# Patient Record
Sex: FEMALE | Employment: UNEMPLOYED | URBAN - NONMETROPOLITAN AREA
[De-identification: names, ages, dates, MRNs, and addresses within clinical notes are randomized per-mention and may not be internally consistent; named-entity substitution may affect disease eponyms.]

---

## 2023-12-14 ENCOUNTER — OFFICE VISIT (OUTPATIENT)
Dept: URGENT CARE | Facility: CLINIC | Age: 78
End: 2023-12-14
Payer: MEDICARE

## 2023-12-14 VITALS
SYSTOLIC BLOOD PRESSURE: 118 MMHG | TEMPERATURE: 98.6 F | HEART RATE: 81 BPM | DIASTOLIC BLOOD PRESSURE: 62 MMHG | RESPIRATION RATE: 16 BRPM | OXYGEN SATURATION: 100 %

## 2023-12-14 DIAGNOSIS — G89.29 CHRONIC RIGHT-SIDED LOW BACK PAIN WITHOUT SCIATICA: Primary | ICD-10-CM

## 2023-12-14 DIAGNOSIS — M54.50 CHRONIC RIGHT-SIDED LOW BACK PAIN WITHOUT SCIATICA: Primary | ICD-10-CM

## 2023-12-14 PROCEDURE — 99203 OFFICE O/P NEW LOW 30 MIN: CPT

## 2023-12-14 PROCEDURE — G0463 HOSPITAL OUTPT CLINIC VISIT: HCPCS

## 2023-12-14 RX ORDER — PREDNISONE 20 MG/1
20 TABLET ORAL 2 TIMES DAILY WITH MEALS
Qty: 10 TABLET | Refills: 0 | Status: SHIPPED | OUTPATIENT
Start: 2023-12-14 | End: 2023-12-19

## 2023-12-14 RX ORDER — OMEPRAZOLE 40 MG/1
40 CAPSULE, DELAYED RELEASE ORAL DAILY
COMMUNITY

## 2023-12-14 RX ORDER — MECLIZINE HYDROCHLORIDE 25 MG/1
25 TABLET ORAL EVERY 8 HOURS PRN
COMMUNITY

## 2023-12-14 RX ORDER — NEBIVOLOL 5 MG/1
5 TABLET ORAL DAILY
COMMUNITY

## 2023-12-14 NOTE — PROGRESS NOTES
[facility]        NAME: Zuleika Burgess is a 66 y.o. female  : 1945    MRN: [id number]  DATE: 2023  TIME: 4:42 PM    Assessment and Plan   Chronic right-sided low back pain without sciatica [M54.50, G89.29]  1. Chronic right-sided low back pain without sciatica  predniSONE 20 mg tablet    Ambulatory Referral to Physical Therapy        Chronic right lower back pain without sciatica. This has been going on for a long time. Has not been to physical therapy. PT states she got prednisone prescribed to her in the past which was very helpful. Will send that in today (history of diabetes on insulin) advised to monitor blood sugar. Given referral physical therapy. Advised to go to the ER if any symptoms worsen. Patient Instructions     Take prescribed medication as instructed. Monitor blood sugars closely. Tylenol for pain. Avoid ibuprofen until finished with steroids. Warm compresses 4-5 times daily for 10 to 15 minutes. May alternate with ice. Stretching as instructed. Follow-up with physical therapy as discussed. If any symptoms worsen-go to the emergency room. Follow up with PCP in 3-5 days. Proceed to  ER if symptoms worsen. Chief Complaint     Chief Complaint   Patient presents with    Back Pain     Started 3 days ago  Right posterior back pain, and right buttock pain]  Pain is stabbing pain  OTC tylenol, and ibuprofen         History of Present Illness       77-year-old female presents to the clinic with right low back pain that does not radiate down her right leg. PT states has been going on for about 3 days but has been a chronic issue for a long time. Describes pain as stabbing. Taking Tylenol and ibuprofen over-the-counter. PT is a diabetic on insulin. States she was given prednisone in the past which really helped her symptoms.   Denies any fever, chills, numbness, tingling, saddle anesthesia, bowel/bladder incontinence, chest pain, shortness of breath. Review of Systems   Review of Systems   Constitutional: Negative. HENT: Negative. Respiratory: Negative. Cardiovascular: Negative. Genitourinary: Negative. Musculoskeletal:  Positive for back pain. Neurological: Negative. Current Medications       Current Outpatient Medications:     insulin regular (HumuLIN R,NovoLIN R) 100 units/mL injection, Inject 7 Units under the skin 2 (two) times a day with meals, Disp: , Rfl:     meclizine (ANTIVERT) 25 mg tablet, Take 25 mg by mouth every 8 (eight) hours as needed for dizziness, Disp: , Rfl:     nebivolol (BYSTOLIC) 5 mg tablet, Take 5 mg by mouth daily, Disp: , Rfl:     omeprazole (PriLOSEC) 40 MG capsule, Take 40 mg by mouth daily, Disp: , Rfl:     predniSONE 20 mg tablet, Take 1 tablet (20 mg total) by mouth 2 (two) times a day with meals for 5 days, Disp: 10 tablet, Rfl: 0    Current Allergies     Allergies as of 12/14/2023 - Reviewed 12/14/2023   Allergen Reaction Noted    Insulin Rash 03/10/2008            The following portions of the patient's history were reviewed and updated as appropriate: allergies, current medications, past family history, past medical history, past social history, past surgical history and problem list.     Past Medical History:   Diagnosis Date    Diabetes mellitus ([address] [facility])     Gastric cancer ([address] [facility])     GERD (gastroesophageal reflux disease)     Hypertension        Past Surgical History:   Procedure Laterality Date    LAPAROSCOPIC PARTIAL GASTRECTOMY         No family history on file. Medications have been verified. Objective   /62   Pulse 81   Temp 98.6 °F (37 °C)   Resp 16   SpO2 100%        Physical Exam     Physical Exam  Constitutional:       General: She is not in acute distress. Appearance: Normal appearance. She is not ill-appearing or diaphoretic. HENT:      Head: Normocephalic and atraumatic. Eyes:      Extraocular Movements: Extraocular movements intact. Pupils: Pupils are equal, round, and reactive to light. Cardiovascular:      Rate and Rhythm: Normal rate and regular rhythm. Pulses: Normal pulses. Heart sounds: Normal heart sounds. Pulmonary:      Effort: Pulmonary effort is normal. No respiratory distress. Breath sounds: Normal breath sounds. Musculoskeletal:      Lumbar back: Tenderness (Mild tenderness to palpation in the right lower paraspinous muscles of the lumbar spine extending out into the right upper buttock region. Straight leg raise does reproduce some pain and discomfort.) present. No swelling, deformity or signs of trauma. Normal range of motion. Back:       Comments: Equal sensation and strength in the knees and hips (with flexion and extension bilaterally). Skin:     General: Skin is warm and dry. Capillary Refill: Capillary refill takes less than 2 seconds. Findings: No erythema or rash. Neurological:      General: No focal deficit present. Mental Status: She is alert and oriented to person, place, and time. Mental status is at baseline. GCS: GCS eye subscore is 4. GCS verbal subscore is 5. GCS motor subscore is 6. Cranial Nerves: Cranial nerves 2-12 are intact. No cranial nerve deficit. Sensory: Sensation is intact. No sensory deficit. Motor: Motor function is intact. No weakness. Coordination: Coordination is intact. Coordination normal.      Gait: Gait normal.      Deep Tendon Reflexes: Reflexes are normal and symmetric.  Reflexes normal.   Psychiatric:         Mood and Affect: Mood normal.         Behavior: Behavior normal.

## 2023-12-14 NOTE — PATIENT INSTRUCTIONS
Take prescribed medication as instructed. Monitor blood sugars closely. Tylenol for pain. Avoid ibuprofen until finished with steroids. Warm compresses 4-5 times daily for 10 to 15 minutes. May alternate with ice. Stretching as instructed. Follow-up with physical therapy as discussed. If any symptoms worsen-go to the emergency room. Follow up with PCP in 3-5 days. Proceed to  ER if symptoms worsen. Ejercicios para la espalda baja   CUIDADO AMBULATORIO:   Los ejercicios para la espalda baja ayudan a sanar y a fortalecer los músculos de perez espalda para evitar otra lesión. Pregúntele a perez médico si usted necesita acudir con un fisioterapeuta para que le indique ejercicios más avanzado. Busque atención médica de inmediato si:  Usted tiene dolor severo que le impide moverse. Llame a perez médico si:  Perez dolor empeora. Usted tiene un dolor nuevo. Usted tiene preguntas o inquietudes acerca de perez condición o cuidado. Realice los ejercicios para la espalda baja de manera zee:  Cornelio los ejercicios sobre chase colchoneta o superficie firme (no sobre chase cama). Chase superficie firme sostendrá perez columna y evitará el dolor lumbar. Muévase lenta y suavemente. Evite movimientos rápidos o bruscos. Respire normalmente. No contenga la respiración. Deténgase si siente dolor. Es normal sentir alguna molestia al principio, ravinder no debería sentir dolor. Practicar los ejercicios con regularidad ayudará a disminuir perez incomodidad con el paso del [place]. Ejercicios para la espalda baja: Perez médico podría recomendarle que realice ejercicios para la espalda de 10 a 30 minutos cada día. También podría recomendarle que cornelio ejercicios de 1 a 3 veces cada día. Pregunte a perez médico cuáles ejercicios son los mejores para usted y con qué frecuencia hacerlos. Bombeo del tobillo: Acuéstese boca arriba. Levante perez pie (con fede dedos apuntando hacia perez jr).  Luego, baje perez pie (con los dedos apuntando lejos de usted). Repita luc ejercicio 10 veces en cada lado. Deslizamiento de talón: Acuéstese boca arriba. Muy despacio doble chase pierna y luego enderécela. Luego, doble la otra pierna y enderécela. Repita 10 veces en cada lado. Inclinación pélvica: Acuéstese boca arriba con fede rodillas dobladas y fede pies planos sobre el piso. Coloque fede brazos en chase posición relajada junto a perez cuerpo. Contraiga los músculos de perez abdomen y aplane perez espalda contra el piso. Sostenga está posición por 5 segundos. Repita 5 veces. Estiramiento de la espalda: Acuéstese boca arriba con fede keisha detrás de perez jr. Doble fede rodillas y gire la mitad de perez cuerpo hacia un lado. Mantenga esta posición por 10 segundos. Repita 3 veces en cada lado. Levantamiento de la pierna estirada: Acuéstese boca Vicente Earing con chase pierna estirada. Doble la otra rodilla. Contraiga perez abdomen y luego levante lentamente la pierna estirada entre 6 a 12 pulgadas del piso. Mantenga esta posición por 1 a 5 segundos. Baje perez pierna lentamente. Repita 10 veces en cada pierna. Rodillas al pecho: Acuéstese boca arriba con fede rodillas dobladas y fede pies planos sobre el piso. Gleda  chase de las rodillas hacia perez pecho y sosténgala por 5 segundos. Regrese perez pierna a la posición inicial. Levante la otra rodilla hacia el pecho y sosténgala por 5 segundos. Miryam esto 5 veces en cada lado. Posición hieu camello: Coloque fede keisha y [employer] Arquee perez espalda Naty [place], hacia el techo y [place] Arquee perez elyssa dorsal lo más posible. Sostenga está posición por 5 segundos. Levante perez jr hacia arriba y baje perez pecho hacia el piso. Sostenga está posición por 5 segundos. Miryam 3 series o blake se le indique. Posición de cuclillas contra la pared: Párese con perez espalda contra la pared. Contraiga los músculos de perez abdomen.  Lentamente deslice perez cuerpo hasta que fede rodillas queden dobladas en un ángulo de 45 grados. Mantenga esta posición por 5 segundos. Deslice lentamente perez espalda hacia arriba hasta quedar de pie. Repita 10 veces. Posición de acurrucarse: Acuéstese boca arriba con fede rodillas dobladas y fede pies planos sobre el piso. Coloque fede keisha con las suraj hacia abajo debajo de la curva de la parte baja de perez espalda. Después, con fede codos [employer], levante fede hombros y [place] 2 a 3 pulgadas. Mantenga perez jr a la misma altura de fede hombros. Mantenga esta posición por 5 segundos. Cuando usted pueda hacer luc ejercicio sin sentir dolor por 10 a 15 segundos, puede entonces añadir chase rotación. Mientras fede hombros y perez pecho estén levantados del piso, voltee levemente hacia la izquierda y [place]. Repita en el otro lado. Ejercicio pájaro denise: Coloque fede keisha y rodillas sobre el piso. Mantenga fede muñecas directamente debajo de fede hombros y fede rodillas directamente debajo de fede caderas. Contraiga perez ombligo hacia adentro en dirección a perez columna. No estire ni arquee perez espalda. Ponga tensos fede músculos abdominales. Levante un brazo extendido para que se alinee con perez jr. Luego, levante la pierna opuesta a perez brazo. Mantenga esta posición por 15 segundos. Baje perez [employer] y pierna lentamente y prince edward isl de lado. Miryam 5 series. Acuda a la consulta de control con perez médico según las indicaciones: Anote fede preguntas para que se acuerde de hacerlas rosendo fede visitas. © Copyright Marco Antonio Ports 2023 Information is for End User's use only and may not be sold, redistributed or otherwise used for commercial purposes. Esta información es sólo para uso en educación. Perez intención no es darle un consejo médico sobre enfermedades o tratamientos. Colsulte con perez [facility] farmacéutico antes de seguir cualquier régimen médico para saber si es seguro y efectivo para usted. Dolor de espalda   CUIDADO AMBULATORIO:   Dolor en la espalda es común.  Usted puede tener dolor de espalda y espasmos musculares. Usted puede estar adolorido o sentir rigidez en eileen o ambos lados de la espalda. El dolor se puede propagar a la parte baja del cuerpo. Las condiciones que afectan la columna, las articulaciones, o los músculos pueden causar el dolor de espalda. Estos pueden incluir la artritis, estenosis de la elyssa dorsal (estrechamiento de la columna vertebral), tensión muscular o descomposición de los discos de la columna vertebral.  Llame al [facility] de emergencias local (911 en los [place]) si:  Usted tiene dolor de espalda intenso con dolor en el pecho. Usted no puede controlar perez orina ni fede deposiciones intestinales. El dolor se vuelve tan intenso que lo incapacita para caminar. Busque atención médica de inmediato si:  Usted tiene dolor, entumecimiento o debilidad en chase o en ambas piernas. Usted tiene dolor de espalda intenso, náuseas y vómito. Usted tiene un dolor de espalda intenso que se propaga a un costado o al área genital.    Llame a perez médico si:  Usted tiene dolor de espalda que no mejora con el reposo, ni con el medicamento para el dolor. Tiene fiebre. Usted tiene un dolor que empeora cuando está acostado boca Doren Herder o al descansar. Usted tiene [employer] cuando tose o estornuda. Usted pierde peso sin proponérselo. Usted tiene preguntas o inquietudes acerca de perez condición o cuidado. Medicamentos: Es posible que usted necesite alguno de los siguientes:  SATINDER pueden disminuir la inflamación y el dolor o la fiebre. Luc medicamento está disponible con o sin chase receta médica. Los SATINDER pueden causar sangrado estomacal o problemas renales en ciertas personas. Si usted jamey un medicamento anticoagulante, siempre pregúntele a perez médico si los SATINDER son seguros para usted. Siempre helena la etiqueta de luc medicamento y [address] instrucciones. Acetaminofén [employer] dolor y baja la fiebre. Está disponible sin receta médica. Pregunte la cantidad y la frecuencia con que debe tomarlos. [address] Shyann las etiquetas de todos los demás medicamentos que esté usando para saber si también contienen acetaminofén, o pregunte a perez médico o farmacéutico. El acetaminofén puede causar daño en el hígado cuando no se jamey de forma correcta. Relajantes musculares ayudan a disminuir los espasmos musculares y el dolor de espalda. [place] fede medicamentos blake se le haya indicado. Consulte con perez médico si usted brent que perez medicamento no le está ayudando o si presenta efectos secundarios. Infórmele al médico si usted es alérgico a algún medicamento. Mantenga chase lista actualizada de los [place], las vitaminas y los productos herbales que jamey. Incluya los siguientes datos de los medicamentos: cantidad, frecuencia y motivo de administración. Traiga con usted la lista o los envases de las píldoras a fede citas de seguimiento. Lleve la lista de los medicamentos con usted en kamlesh de chase emergencia. Controlando perez dolor de espalda:  Aplique hielo en la espalda de 15 a 20 minutos cada hora o blake se le indique. Use chase compresa de hielo o ponga hielo triturado en chase bolsa de plástico. Kyler Distance con chase toalla antes de aplicarlo sobre perez piel. El hielo disminuye el dolor y ayuda a evitar el daño en los tejidos. Aplique calor en la espalda de 20 a 30 minutos cada 2 horas por los [employer] indiquen. El calor ayuda a disminuir el dolor y los espasmos musculares. Manténgase activo lo más que pueda sin causar más dolor. El reposo en cama puede empeorar perez dolor de espalda. Evite levantar objetos hasta que ya no tenga dolor. Vaya a terapia física según las indicaciones. Un fisioterapeuta puede enseñarle ejercicios que contribuyan a mejorar perez movimiento y fuerza, y a aliviar el dolor. Acuda a chase consulta de control con perez médico dentro de 2 semanas, o según le hayan indicado: Es posible que necesite nida a un especialista.  Anote fede preguntas para que se acuerde de hacerlas rosendo fede visitas. © Copyright Rosaura Abreu 2023 Information is for End User's use only and may not be sold, redistributed or otherwise used for commercial purposes. Esta información es sólo para uso en educación. Perez intención no es darle un consejo médico sobre enfermedades o tratamientos. Colsulte con perez Jermikelene Brandon farmacéutico antes de seguir cualquier régimen médico para saber si es seguro y efectivo para usted.

## 2023-12-20 ENCOUNTER — EVALUATION (OUTPATIENT)
Dept: PHYSICAL THERAPY | Facility: CLINIC | Age: 78
End: 2023-12-20
Payer: MEDICARE

## 2023-12-20 DIAGNOSIS — M54.50 ACUTE RIGHT-SIDED LOW BACK PAIN WITHOUT SCIATICA: Primary | ICD-10-CM

## 2023-12-20 PROCEDURE — 97162 PT EVAL MOD COMPLEX 30 MIN: CPT | Performed by: PHYSICAL THERAPIST

## 2023-12-20 NOTE — PROGRESS NOTES
"PT Evaluation     Today's date: 2023  Patient name: Schuyler Dowling  : 1945  MRN: [id number]  Referring provider: Justo Villafana*  Dx:   Encounter Diagnosis     ICD-10-CM    1. Acute right-sided low back pain without sciatica  M54.50           Start Time: 1400  Stop Time: 1450  Total time in clinic (min): 50 minutes    Assessment  Assessment details: Patient is a 79 y/o female here today with chief c/o R sided low back pain. Patient is accompanied by her daughter who per her request is assisting with translation. Patient has noted tenderness with percussion of the R side of the T11-12 segment. She has marked limitation with lumbar mobility assessment today secondary to increased pain. She is not tolerant for either prone or supine positions secondary to increased pain. There is limitation noted to B/L LE strength most noted with hip flexion and extension with increased pain during resisted adduction and flexion. Held on treatment today secondary to recent x-rays which have not been read yet or communication of results have not been passed along to the patient. We will await confirmation of no fracture due to history of osteoporosis and spinal fracture.   Impairments: abnormal or restricted ROM, activity intolerance, impaired physical strength, lacks appropriate home exercise program, pain with function and poor posture     Symptom irritability: highUnderstanding of Dx/Px/POC: good   Prognosis: fair    Goals  STGs:  \"Patient will be independent with hep by 2-3 visits.   Decrease low back pain by 25% for improved tolerance with adls and home duties by 3-4 weeks.   Improve Lumbar rom to wfl for improved tolerance with adls and home duties by 3-4 weeks. \"    LTGs:  \"Improve FOTO score from 24 to 53 indicating improved tolerance with activities involving the low back by discharge.   Patient will demonstrate rom and strength to the lumbar spine wfl for improved tolerance with adls and home duties by " "discharge.   Patient will be free of radicular symptoms by discharge. \"      Plan  Plan details: Patient informed that from this point forward, to ensure adherence to the aforementioned plan of care, all or some of the treatment may be performed and carried out by a Physical Therapy Assistant (PTA) with supervision from a licensed Physical Therapist (PT) in accordance with [facility] Physical Therapy Practice Act.  Patient will continue to benefit from skilled physical therapy to address the functional deficits that were identified during the evaluation today. We will continue to progress the therapy program to address these functional deficits and achieve the established goals.          Patient would benefit from: skilled physical therapy  Planned modality interventions: cryotherapy and thermotherapy: hydrocollator packs  Planned therapy interventions: abdominal trunk stabilization, ADL retraining, body mechanics training, flexibility, functional ROM exercises, home exercise program, therapeutic exercise, therapeutic activities, stretching, strengthening, postural training, patient education, joint mobilization and manual therapy  Frequency: 2x week  Duration in weeks: 6  Plan of Care beginning date: 12/20/2023  Plan of Care expiration date: 1/31/2024  Treatment plan discussed with: patient      Subjective Evaluation    History of Present Illness  Mechanism of injury: Patient presents to out patient physical therapy with chief c/o R sided LBP. Patient reports acute exacerbation of R sided low back pain over the past week. Prior to this she had an episode last September but was pain free between then and now. She has a history of low back procedure in 2010 where she had a fractured vertebral body which was treated with cementing procedure. She notes that her pain was slightly more controlled last night due to the new medication she was provided. She is denying symptoms into the R LE. No recent changes to " bowel or bladder function.           Not a recurrent problem   Quality of life: good    Patient Goals  Patient goals for therapy: decreased pain, independence with ADLs/IADLs and increased motion  Patient goal: Patient wishes to be able to return to her normal adls and house work without restriction.  Pain  Current pain ratin  At best pain ratin  At worst pain rating: 10  Location: Lumbar  Quality: radiating, discomfort, dull ache and sharp  Relieving factors: medications and heat  Aggravating factors: sitting, standing, walking and lifting    Social Support  Lives with: adult children    Employment status: not working  Treatments  Previous treatment: medication      Objective     Concurrent Complaints  Positive for night pain, disturbed sleep and history of cancer. Negative for bladder dysfunction, bowel dysfunction, saddle (S4) numbness, history of trauma and infection    Neurological Testing     Sensation     Lumbar   Left   Intact: light touch    Right   Intact: light touch    Reflexes   Left   Patellar (L4): trace (1+)  Achilles (S1): trace (1+)  Clonus sign: negative    Right   Patellar (L4): trace (1+)  Achilles (S1): trace (1+)  Clonus sign: negative    Active Range of Motion     Lumbar   Flexion:  with pain Restriction level: moderate  Extension:  Restriction level: maximal  Left lateral flexion:  with pain Restriction level: moderate  Right lateral flexion:  with pain Restriction level: moderate  Left rotation:  with pain Restriction level: moderate  Right rotation:  with pain Restriction level: moderate    Strength/Myotome Testing     Left Hip   Planes of Motion   Flexion: 3+    Right Hip   Planes of Motion   Flexion: 3+    Left Knee   Flexion: 4+  Extension: 4    Right Knee   Flexion: 4+  Extension: 4    Left Ankle/Foot   Dorsiflexion: 4+  Plantar flexion: 4+  Great toe extension: 4+    Right Ankle/Foot   Dorsiflexion: 4+  Plantar flexion: 4+  Great toe extension: 4+    Tests     Additional Tests  Details  Positive percussion test to the R side T11/12 segment.       Flowsheet Rows      Flowsheet Row Most Recent Value   PT/OT G-Codes    Current Score 24   Projected Score 53               Precautions: Hx of gastric cancer, Hx of osteoporosis, Hx of spinal fracture.       Date 12/20 IE       FOTO 24 SC       Manuals                                        Neuro Re-Ed                                                                Ther Ex                                                                        Ther Activity                        Gait Training                        Modalities

## 2023-12-20 NOTE — LETTER
2023    Justo Villafana PA-C  [address] [facility]  [place] [address]    Patient: Schuyler Dowling   YOB: 1945   Date of Visit: 2023     Encounter Diagnosis     ICD-10-CM    1. Acute right-sided low back pain without sciatica  M54.50           Dear Dr. Villafana:    Thank you for your recent referral of Schuyler Dowling. Please review the attached evaluation summary from Schuyler's recent visit.     Please verify that you agree with the plan of care by signing the attached order.     If you have any questions or concerns, please do not hesitate to call.     I sincerely appreciate the opportunity to share in the care of one of your patients and hope to have another opportunity to work with you in the near future.       Sincerely,    Tru Quispe, PT      Referring Provider:      I certify that I have read the below Plan of Care and certify the need for these services furnished under this plan of treatment while under my care.                    Justo Villafana PA-C  [address] [facility]  [place] [address]  Via Fax: [phone]          PT Evaluation     Today's date: 2023  Patient name: Schuyler Dowling  : 1945  MRN: [id number]  Referring provider: Justo Villafana*  Dx:   Encounter Diagnosis     ICD-10-CM    1. Acute right-sided low back pain without sciatica  M54.50           Start Time: 1400  Stop Time: 1450  Total time in clinic (min): 50 minutes    Assessment  Assessment details: Patient is a 77 y/o female here today with chief c/o R sided low back pain. Patient is accompanied by her daughter who per her request is assisting with translation. Patient has noted tenderness with percussion of the R side of the T11-12 segment. She has marked limitation with lumbar mobility assessment today secondary to increased pain. She is not tolerant for either prone or supine positions secondary to increased pain. There is limitation noted to B/L LE strength most noted  "with hip flexion and extension with increased pain during resisted adduction and flexion. Held on treatment today secondary to recent x-rays which have not been read yet or communication of results have not been passed along to the patient. We will await confirmation of no fracture due to history of osteoporosis and spinal fracture.   Impairments: abnormal or restricted ROM, activity intolerance, impaired physical strength, lacks appropriate home exercise program, pain with function and poor posture     Symptom irritability: highUnderstanding of Dx/Px/POC: good   Prognosis: fair    Goals  STGs:  \"Patient will be independent with hep by 2-3 visits.   Decrease low back pain by 25% for improved tolerance with adls and home duties by 3-4 weeks.   Improve Lumbar rom to wfl for improved tolerance with adls and home duties by 3-4 weeks. \"    LTGs:  \"Improve FOTO score from 24 to 53 indicating improved tolerance with activities involving the low back by discharge.   Patient will demonstrate rom and strength to the lumbar spine wfl for improved tolerance with adls and home duties by discharge.   Patient will be free of radicular symptoms by discharge. \"      Plan  Plan details: Patient informed that from this point forward, to ensure adherence to the aforementioned plan of care, all or some of the treatment may be performed and carried out by a Physical Therapy Assistant (PTA) with supervision from a licensed Physical Therapist (PT) in accordance with [facility] Physical Therapy Practice Act.  Patient will continue to benefit from skilled physical therapy to address the functional deficits that were identified during the evaluation today. We will continue to progress the therapy program to address these functional deficits and achieve the established goals.          Patient would benefit from: skilled physical therapy  Planned modality interventions: cryotherapy and thermotherapy: hydrocollator packs  Planned therapy " interventions: abdominal trunk stabilization, ADL retraining, body mechanics training, flexibility, functional ROM exercises, home exercise program, therapeutic exercise, therapeutic activities, stretching, strengthening, postural training, patient education, joint mobilization and manual therapy  Frequency: 2x week  Duration in weeks: 6  Plan of Care beginning date: 2023  Plan of Care expiration date: 2024  Treatment plan discussed with: patient      Subjective Evaluation    History of Present Illness  Mechanism of injury: Patient presents to out patient physical therapy with chief c/o R sided LBP. Patient reports acute exacerbation of R sided low back pain over the past week. Prior to this she had an episode last September but was pain free between then and now. She has a history of low back procedure in  where she had a fractured vertebral body which was treated with cementing procedure. She notes that her pain was slightly more controlled last night due to the new medication she was provided. She is denying symptoms into the R LE. No recent changes to bowel or bladder function.           Not a recurrent problem   Quality of life: good    Patient Goals  Patient goals for therapy: decreased pain, independence with ADLs/IADLs and increased motion  Patient goal: Patient wishes to be able to return to her normal adls and house work without restriction.  Pain  Current pain ratin  At best pain ratin  At worst pain rating: 10  Location: Lumbar  Quality: radiating, discomfort, dull ache and sharp  Relieving factors: medications and heat  Aggravating factors: sitting, standing, walking and lifting    Social Support  Lives with: adult children    Employment status: not working  Treatments  Previous treatment: medication      Objective     Concurrent Complaints  Positive for night pain, disturbed sleep and history of cancer. Negative for bladder dysfunction, bowel dysfunction, saddle (S4) numbness,  history of trauma and infection    Neurological Testing     Sensation     Lumbar   Left   Intact: light touch    Right   Intact: light touch    Reflexes   Left   Patellar (L4): trace (1+)  Achilles (S1): trace (1+)  Clonus sign: negative    Right   Patellar (L4): trace (1+)  Achilles (S1): trace (1+)  Clonus sign: negative    Active Range of Motion     Lumbar   Flexion:  with pain Restriction level: moderate  Extension:  Restriction level: maximal  Left lateral flexion:  with pain Restriction level: moderate  Right lateral flexion:  with pain Restriction level: moderate  Left rotation:  with pain Restriction level: moderate  Right rotation:  with pain Restriction level: moderate    Strength/Myotome Testing     Left Hip   Planes of Motion   Flexion: 3+    Right Hip   Planes of Motion   Flexion: 3+    Left Knee   Flexion: 4+  Extension: 4    Right Knee   Flexion: 4+  Extension: 4    Left Ankle/Foot   Dorsiflexion: 4+  Plantar flexion: 4+  Great toe extension: 4+    Right Ankle/Foot   Dorsiflexion: 4+  Plantar flexion: 4+  Great toe extension: 4+    Tests     Additional Tests Details  Positive percussion test to the R side T11/12 segment.       Flowsheet Rows      Flowsheet Row Most Recent Value   PT/OT G-Codes    Current Score 24   Projected Score 53               Precautions: Hx of gastric cancer, Hx of osteoporosis, Hx of spinal fracture.       Date 12/20 IE       FOTO 24 SC       Manuals                                        Neuro Re-Ed                                                                Ther Ex                                                                        Ther Activity                        Gait Training                        Modalities

## 2023-12-27 ENCOUNTER — OFFICE VISIT (OUTPATIENT)
Dept: PHYSICAL THERAPY | Facility: CLINIC | Age: 78
End: 2023-12-27
Payer: MEDICARE

## 2023-12-27 DIAGNOSIS — M54.50 ACUTE RIGHT-SIDED LOW BACK PAIN WITHOUT SCIATICA: Primary | ICD-10-CM

## 2023-12-27 PROCEDURE — 97112 NEUROMUSCULAR REEDUCATION: CPT

## 2023-12-27 PROCEDURE — 97110 THERAPEUTIC EXERCISES: CPT

## 2023-12-27 NOTE — PROGRESS NOTES
"Daily Note     Today's date: 2023  Patient name: Schuyler Dowling  : 1945  MRN: [id number]  Referring provider: Justo Villafana*  Dx:   Encounter Diagnosis     ICD-10-CM    1. Acute right-sided low back pain without sciatica  M54.50           Start Time: 1500  Stop Time: 1530  Total time in clinic (min): 30 minutes    Subjective: Pt notes receiving xray report clear of fracture; she does have some R sided thoracic pain upon arrival to the clinic.      Objective: See treatment diary below      Assessment: Tolerated treatment fair. Patient would benefit from continued PT. PTA initiated gentle  activities for core strengthening, flexibility per PT POC.  Handouts given for HEP of same minus nu-step.      Plan: Continue per plan of care.      Precautions: Hx of gastric cancer, Hx of osteoporosis, Hx of spinal fracture.       Date  IE       FOTO 24 SC       Manuals                                        Neuro Re-Ed        TA sitting  15x 5\" seated      TA c march  15x 5\" seated                                              Ther Ex        Nu-Step  5m L3       Ball roll out  10x 5\"                                                      Ther Activity        STS  NV              Gait Training                        Modalities        HP  10m                     "

## 2024-01-03 ENCOUNTER — OFFICE VISIT (OUTPATIENT)
Dept: PHYSICAL THERAPY | Facility: CLINIC | Age: 79
End: 2024-01-03
Payer: MEDICARE

## 2024-01-03 DIAGNOSIS — M54.50 ACUTE RIGHT-SIDED LOW BACK PAIN WITHOUT SCIATICA: Primary | ICD-10-CM

## 2024-01-03 PROCEDURE — 97110 THERAPEUTIC EXERCISES: CPT | Performed by: PHYSICAL THERAPIST

## 2024-01-03 PROCEDURE — 97112 NEUROMUSCULAR REEDUCATION: CPT | Performed by: PHYSICAL THERAPIST

## 2024-01-03 NOTE — PROGRESS NOTES
"Daily Note     Today's date: 1/3/2024  Patient name: Schuyler Dowling  : 1945  MRN: [id number]  Referring provider: Justo Villafana*  Dx:   Encounter Diagnosis     ICD-10-CM    1. Acute right-sided low back pain without sciatica  M54.50           Start Time: 1400  Stop Time: 1500  Total time in clinic (min): 60 minutes    Subjective: Patient reports improvements with her low back pain. She is sleeping better and does not have as much pain when she is standing or walking.       Objective: See treatment diary below      Assessment: Tolerated treatment well. Patient demonstrated fatigue post treatment, exhibited good technique with therapeutic exercises, and would benefit from continued PT Patient responded well to therapy interventions with a progression of core strengthening. There was a decrease in pain levels noted post therapy interventions.       Plan: Continue per plan of care.  Progress treatment as tolerated.       Precautions: Hx of gastric cancer, Hx of osteoporosis, Hx of spinal fracture.       Date  IE 12/27 1/3     FOTO 24 SC       Manuals                                        Neuro Re-Ed        TA sitting  15x 5\" seated      TA c march  15x 5\" seated 15x ea.                                              Ther Ex        Nu-Step  5m L3  L4 5 min     Ball roll out  10x 5\" 5\" 15x     SKTC   5\" 10x     PPT   5\" 15x     Bridges   5\" 15x                             Ther Activity        STS  NV              Gait Training                        Modalities        HP  10m                       "

## 2024-01-10 ENCOUNTER — OFFICE VISIT (OUTPATIENT)
Dept: PHYSICAL THERAPY | Facility: CLINIC | Age: 79
End: 2024-01-10
Payer: MEDICARE

## 2024-01-10 DIAGNOSIS — M54.50 ACUTE RIGHT-SIDED LOW BACK PAIN WITHOUT SCIATICA: Primary | ICD-10-CM

## 2024-01-10 PROCEDURE — 97112 NEUROMUSCULAR REEDUCATION: CPT

## 2024-01-10 PROCEDURE — 97110 THERAPEUTIC EXERCISES: CPT

## 2024-01-10 NOTE — PROGRESS NOTES
"Daily Note     Today's date: 1/10/2024  Patient name: Schuyler Dowling  : 1945  MRN: [id number]  Referring provider: Justo Villafana*  Dx:   Encounter Diagnosis     ICD-10-CM    1. Acute right-sided low back pain without sciatica  M54.50           Start Time: 1400  Stop Time: 0245  Total time in clinic (min): 765 minutes    Subjective: I am feeling a lot better. I get more pain with bending forward and reaching behind me.       Objective: See treatment diary below      Assessment: Tolerated treatment well. Patient would benefit from continued PT   pt reports having some mild pain at the start of her program . She had to perform the exercises slow due to some pain. She needs verbal and visual cues through out to do them correctly. We worked on sit to stand to help strengthen her legs and exercises for her back .  Pt did feel better after doing her exercises today. She did fatigue quickly today with all exercises.       Plan: Continue per plan of care.      Precautions: Hx of gastric cancer, Hx of osteoporosis, Hx of spinal fracture.       Date  IE 12/27 1/3 1/10    FOTO 24 SC       Manuals                                        Neuro Re-Ed        TA sitting  15x 5\" seated  20 x  5\" seated     TA c march  15x 5\" seated 15x ea.  20 x  3\"                                             Ther Ex        Nu-Step  5m L3  L4 5 min L 4  5 min    Ball roll out  10x 5\" 5\" 15x 5\" 15 x     SKTC   5\" 10x 5\" 10x    PPT   5\" 15x 5\" 15x    Bridges   5\" 15x 5\" 15 x    LTR    5\" 5 x                    Ther Activity        STS  NV  10 x            Gait Training                        Modalities        HP  10m                         "

## 2024-01-17 ENCOUNTER — APPOINTMENT (OUTPATIENT)
Dept: PHYSICAL THERAPY | Facility: CLINIC | Age: 79
End: 2024-01-17
Payer: MEDICARE

## 2024-01-24 ENCOUNTER — OFFICE VISIT (OUTPATIENT)
Dept: PHYSICAL THERAPY | Facility: CLINIC | Age: 79
End: 2024-01-24
Payer: MEDICARE

## 2024-01-24 DIAGNOSIS — M54.50 ACUTE RIGHT-SIDED LOW BACK PAIN WITHOUT SCIATICA: Primary | ICD-10-CM

## 2024-01-24 PROCEDURE — 97110 THERAPEUTIC EXERCISES: CPT

## 2024-01-24 PROCEDURE — 97140 MANUAL THERAPY 1/> REGIONS: CPT

## 2024-01-24 PROCEDURE — 97112 NEUROMUSCULAR REEDUCATION: CPT

## 2024-01-24 NOTE — PROGRESS NOTES
"Daily Note     Today's date: 2024  Patient name: Schuyler Dowling  : 1945  MRN: [id number]  Referring provider: Justo Villafana*  Dx:   Encounter Diagnosis     ICD-10-CM    1. Acute right-sided low back pain without sciatica  M54.50           Start Time: 1200  Stop Time: 1245  Total time in clinic (min): 45 minutes    Subjective: Pt reports absent LBP/N/T.      Objective: See treatment diary below.FOTO 48, increase from IE of 24.      Assessment: Tolerated treatment well. Patient exhibited good technique with therapeutic exercises. PTA added clamshells for increased hip strengthening. Added weight to march also. Added clamshells, handout given for HEP.      Plan: Continue per plan of care.      Precautions: Hx of gastric cancer, Hx of osteoporosis, Hx of spinal fracture.       Date  IE 12/27 1/3 1/10 1/24   FOTO 24 SC    Ds 48   Manuals                        Neuro Re-Ed        TA sitting  15x 5\" seated  20 x  5\" seated  20x 3\"   TA c march  15x 5\" seated 15x ea.  20 x  3\"  1# 20x 3\"                           Ther Ex        Nu-Step  5m L3  L4 5 min L 4  5 min 8m L5   Ball roll out  10x 5\" 5\" 15x 5\" 15 x  15x 5\"   SKTC   5\" 10x 5\" 10x 5x 5\"   PPT   5\" 15x 5\" 15x 20x 5\"   Bridges   5\" 15x 5\" 15 x C add 15x 5\"   LTR    5\" 5 x 5x 5\"   Clamshell     10x 5\"           Ther Activity        STS  NV  10 x 10x            Gait Training                        Modalities        HP  10m                           "

## 2024-01-31 ENCOUNTER — APPOINTMENT (OUTPATIENT)
Dept: PHYSICAL THERAPY | Facility: CLINIC | Age: 79
End: 2024-01-31
Payer: MEDICARE

## 2024-01-31 DIAGNOSIS — M54.50 ACUTE RIGHT-SIDED LOW BACK PAIN WITHOUT SCIATICA: Primary | ICD-10-CM

## 2024-01-31 NOTE — PROGRESS NOTES
"PT Evaluation     Today's date: 2024  Patient name: Schuyler Dowling  : 1945  MRN: [id number]  Referring provider: Justo Villafana*  Dx:   Encounter Diagnosis     ICD-10-CM    1. Acute right-sided low back pain without sciatica  M54.50                      Assessment  Impairments: abnormal or restricted ROM, activity intolerance, impaired physical strength, lacks appropriate home exercise program, pain with function and poor posture     Symptom irritability: highUnderstanding of Dx/Px/POC: good   Prognosis: fair    Goals  STGs:  \"Patient will be independent with hep by 2-3 visits.   Decrease low back pain by 25% for improved tolerance with adls and home duties by 3-4 weeks.   Improve Lumbar rom to wfl for improved tolerance with adls and home duties by 3-4 weeks. \"    LTGs:  \"Improve FOTO score from 24 to 53 indicating improved tolerance with activities involving the low back by discharge.   Patient will demonstrate rom and strength to the lumbar spine wfl for improved tolerance with adls and home duties by discharge.   Patient will be free of radicular symptoms by discharge. \"      Plan  Plan details: Patient informed that from this point forward, to ensure adherence to the aforementioned plan of care, all or some of the treatment may be performed and carried out by a Physical Therapy Assistant (PTA) with supervision from a licensed Physical Therapist (PT) in accordance with [facility] Physical Therapy Practice Act.  Patient will continue to benefit from skilled physical therapy to address the functional deficits that were identified during the evaluation today. We will continue to progress the therapy program to address these functional deficits and achieve the established goals.          Patient would benefit from: skilled physical therapy  Planned modality interventions: cryotherapy and thermotherapy: hydrocollator packs  Planned therapy interventions: abdominal trunk stabilization, " ADL retraining, body mechanics training, flexibility, functional ROM exercises, home exercise program, therapeutic exercise, therapeutic activities, stretching, strengthening, postural training, patient education, joint mobilization and manual therapy  Frequency: 2x week  Duration in weeks: 6  Plan of Care beginning date: 2023  Plan of Care expiration date: 2024  Treatment plan discussed with: patient      Subjective Evaluation    History of Present Illness  Mechanism of injury: Patient presents to out patient physical therapy with chief c/o R sided LBP. Patient reports acute exacerbation of R sided low back pain over the past week. Prior to this she had an episode last September but was pain free between then and now. She has a history of low back procedure in  where she had a fractured vertebral body which was treated with cementing procedure. She notes that her pain was slightly more controlled last night due to the new medication she was provided. She is denying symptoms into the R LE. No recent changes to bowel or bladder function.     Update 2024:          Not a recurrent problem   Quality of life: good    Patient Goals  Patient goals for therapy: decreased pain, independence with ADLs/IADLs and increased motion  Patient goal: Patient wishes to be able to return to her normal adls and house work without restriction.  Pain  Current pain ratin  At best pain ratin  At worst pain rating: 10  Location: Lumbar  Quality: radiating, discomfort, dull ache and sharp  Relieving factors: medications and heat  Aggravating factors: sitting, standing, walking and lifting    Social Support  Lives with: adult children    Employment status: not working  Treatments  Previous treatment: medication      Objective     Concurrent Complaints  Positive for night pain, disturbed sleep and history of cancer. Negative for bladder dysfunction, bowel dysfunction, saddle (S4) numbness, history of trauma and  "infection    Neurological Testing     Sensation     Lumbar   Left   Intact: light touch    Right   Intact: light touch    Reflexes   Left   Patellar (L4): trace (1+)  Achilles (S1): trace (1+)  Clonus sign: negative    Right   Patellar (L4): trace (1+)  Achilles (S1): trace (1+)  Clonus sign: negative    Active Range of Motion     Lumbar   Flexion:  with pain Restriction level: moderate  Extension:  Restriction level: maximal  Left lateral flexion:  with pain Restriction level: moderate  Right lateral flexion:  with pain Restriction level: moderate  Left rotation:  with pain Restriction level: moderate  Right rotation:  with pain Restriction level: moderate    Strength/Myotome Testing     Left Hip   Planes of Motion   Flexion: 3+    Right Hip   Planes of Motion   Flexion: 3+    Left Knee   Flexion: 4+  Extension: 4    Right Knee   Flexion: 4+  Extension: 4    Left Ankle/Foot   Dorsiflexion: 4+  Plantar flexion: 4+  Great toe extension: 4+    Right Ankle/Foot   Dorsiflexion: 4+  Plantar flexion: 4+  Great toe extension: 4+    Tests     Additional Tests Details  Positive percussion test to the R side T11/12 segment.                Precautions: Hx of gastric cancer, Hx of osteoporosis, Hx of spinal fracture.       Date 1/31 Reassess 12/27 1/3 1/10 1/24   FOTO     Ds 48   Manuals                        Neuro Re-Ed        TA sitting  15x 5\" seated  20 x  5\" seated  20x 3\"   TA c march  15x 5\" seated 15x ea.  20 x  3\"  1# 20x 3\"                           Ther Ex        Nu-Step  5m L3  L4 5 min L 4  5 min 8m L5   Ball roll out  10x 5\" 5\" 15x 5\" 15 x  15x 5\"   SKTC   5\" 10x 5\" 10x 5x 5\"   PPT   5\" 15x 5\" 15x 20x 5\"   Bridges   5\" 15x 5\" 15 x C add 15x 5\"   LTR    5\" 5 x 5x 5\"   Clamshell     10x 5\"           Ther Activity        STS  NV  10 x 10x            Gait Training                        Modalities        HP  10m                     "

## 2024-02-07 ENCOUNTER — EVALUATION (OUTPATIENT)
Dept: PHYSICAL THERAPY | Facility: CLINIC | Age: 79
End: 2024-02-07
Payer: MEDICARE

## 2024-02-07 DIAGNOSIS — M54.50 ACUTE RIGHT-SIDED LOW BACK PAIN WITHOUT SCIATICA: Primary | ICD-10-CM

## 2024-02-07 PROCEDURE — 97110 THERAPEUTIC EXERCISES: CPT | Performed by: PHYSICAL THERAPIST

## 2024-02-07 PROCEDURE — 97530 THERAPEUTIC ACTIVITIES: CPT | Performed by: PHYSICAL THERAPIST

## 2024-02-07 PROCEDURE — 97112 NEUROMUSCULAR REEDUCATION: CPT | Performed by: PHYSICAL THERAPIST

## 2024-02-07 NOTE — PROGRESS NOTES
"PT Re-Evaluation     Today's date: 2024  Patient name: Schuyler Dowling  : 1945  MRN: [id number]  Referring provider: Justo Villafana  Dx:   Encounter Diagnosis     ICD-10-CM    1. Acute right-sided low back pain without sciatica  M54.50           Start Time: 1000  Stop Time: 1100  Total time in clinic (min): 60 minutes    Assessment  Assessment details: Patient has attended 6 physical therapy visits to date. She is demonstrating improved lumbar mobility and improving strength of the lumbar spine and Les. She has greatest limitation with lumbar extension secondary to restricted mobility and increased low back discomfort which resolves upon return to neutral. Her daughter was present today for assistance with translation as needed. We discussed the importance of continuing her exercises at home and to reach out to the clinic should there be any issues over the next several weeks.   Impairments: abnormal or restricted ROM, activity intolerance, impaired physical strength and lacks appropriate home exercise program    Symptom irritability: lowUnderstanding of Dx/Px/POC: good   Prognosis: fair    Goals  STGs:  \"Patient will be independent with hep by 2-3 visits. - MET  Decrease low back pain by 25% for improved tolerance with adls and home duties by 3-4 weeks. - MET  Improve Lumbar rom to wfl for improved tolerance with adls and home duties by 3-4 weeks. \" - MET    LTGs:  \"Improve FOTO score from 24 to 53 indicating improved tolerance with activities involving the low back by discharge. - MET  Patient will demonstrate rom and strength to the lumbar spine wfl for improved tolerance with adls and home duties by discharge. - MET  Patient will be free of radicular symptoms by discharge. \" - MET      Plan  Plan details: D/C to [facility].      Treatment plan discussed with: patient and family      Subjective Evaluation    History of Present Illness  Mechanism of injury: Patient presents to out patient physical " therapy with chief c/o R sided LBP. Patient reports acute exacerbation of R sided low back pain over the past week. Prior to this she had an episode last September but was pain free between then and now. She has a history of low back procedure in  where she had a fractured vertebral body which was treated with cementing procedure. She notes that her pain was slightly more controlled last night due to the new medication she was provided. She is denying symptoms into the R LE. No recent changes to bowel or bladder function.     Update 2024:  Patient reports that she has much improved pain in her back since starting therapy. She feels that she still has some discomfort when she is bending forward for longer periods of time or has to bend forward repetitively. She feels that she is sleeping well and her sleep is not disturbed by her back pain any longer. She is able to move around in bed without any issues.           Not a recurrent problem   Quality of life: good    Patient Goals  Patient goals for therapy: decreased pain, independence with ADLs/IADLs and increased motion  Patient goal: Patient wishes to be able to return to her normal adls and house work without restriction.  Pain  Current pain ratin  At best pain ratin  At worst pain ratin  Location: Lumbar  Quality: discomfort and dull ache  Relieving factors: medications and heat  Aggravating factors: sitting, standing, walking and lifting    Social Support  Lives with: adult children    Employment status: not working  Treatments  Previous treatment: medication      Objective     Concurrent Complaints  Positive for history of cancer. Negative for night pain, disturbed sleep, bladder dysfunction, bowel dysfunction, saddle (S4) numbness, history of trauma and infection    Neurological Testing     Sensation     Lumbar   Left   Intact: light touch    Right   Intact: light touch    Reflexes   Left   Patellar (L4): trace (1+)  Achilles (S1): trace  "(1+)  Clonus sign: negative    Right   Patellar (L4): trace (1+)  Achilles (S1): trace (1+)  Clonus sign: negative    Active Range of Motion     Lumbar   Flexion:  WFL  Extension:  with pain Restriction level: moderate  Left lateral flexion:  Restriction level: minimal  Right lateral flexion:  Restriction level: minimal  Left rotation:  Restriction level: minimal  Right rotation:  Restriction level: minimal    Strength/Myotome Testing     Left Hip   Planes of Motion   Flexion: 4-    Right Hip   Planes of Motion   Flexion: 4-    Left Knee   Flexion: 4+  Extension: 4+    Right Knee   Flexion: 4+  Extension: 4+    Left Ankle/Foot   Dorsiflexion: 4+  Plantar flexion: 4+  Great toe extension: 4+    Right Ankle/Foot   Dorsiflexion: 4+  Plantar flexion: 4+  Great toe extension: 4+      Flowsheet Rows      Flowsheet Row Most Recent Value   PT/OT G-Codes    Current Score 53   Projected Score 53                 Precautions: Hx of gastric cancer, Hx of osteoporosis, Hx of spinal fracture.       Date 2/7 Reassess 12/27 1/3 1/10 1/24   FOTO 53 SC    Ds 48   Manuals                        Neuro Re-Ed        TA sitting Standing 5\" 20x 15x 5\" seated  20 x  5\" seated  20x 3\"   TA c march 1.5# 15x 15x 5\" seated 15x ea.  20 x  3\"  1# 20x 3\"                           Ther Ex        Nu-Step L5 8 min 5m L3  L4 5 min L 4  5 min 8m L5   Ball roll out 5\" 10x 10x 5\" 5\" 15x 5\" 15 x  15x 5\"   SKTC 5\" 10x  5\" 10x 5\" 10x 5x 5\"   PPT 5\" 20x  5\" 15x 5\" 15x 20x 5\"   Bridges 5\" 15x  5\" 15x 5\" 15 x C add 15x 5\"   LTR 5\" 10x   5\" 5 x 5x 5\"   Clamshell 5\" 10x    10x 5\"           Ther Activity        STS 15x NV  10 x 10x            Gait Training                        Modalities        HP  10m                     "

## 2024-03-25 DIAGNOSIS — G89.29 CHRONIC RIGHT-SIDED LOW BACK PAIN WITHOUT SCIATICA: ICD-10-CM

## 2024-03-25 DIAGNOSIS — M54.50 CHRONIC RIGHT-SIDED LOW BACK PAIN WITHOUT SCIATICA: ICD-10-CM

## 2024-06-23 RX ORDER — PREDNISONE 20 MG/1
20 TABLET ORAL 2 TIMES DAILY WITH MEALS
Qty: 10 TABLET | Refills: 0 | OUTPATIENT
Start: 2024-06-23